# Patient Record
Sex: FEMALE | Race: WHITE | Employment: STUDENT | URBAN - METROPOLITAN AREA
[De-identification: names, ages, dates, MRNs, and addresses within clinical notes are randomized per-mention and may not be internally consistent; named-entity substitution may affect disease eponyms.]

---
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
## 2023-05-10 ENCOUNTER — OFFICE VISIT (OUTPATIENT)
Age: 23
End: 2023-05-10
Payer: COMMERCIAL

## 2023-05-10 VITALS
BODY MASS INDEX: 22.81 KG/M2 | HEIGHT: 69 IN | OXYGEN SATURATION: 98 % | HEART RATE: 92 BPM | DIASTOLIC BLOOD PRESSURE: 71 MMHG | SYSTOLIC BLOOD PRESSURE: 101 MMHG | TEMPERATURE: 97.6 F | WEIGHT: 154 LBS | RESPIRATION RATE: 18 BRPM

## 2023-05-10 DIAGNOSIS — Z76.89 ENCOUNTER TO ESTABLISH CARE WITH NEW DOCTOR: Primary | ICD-10-CM

## 2023-05-10 DIAGNOSIS — F41.1 GAD (GENERALIZED ANXIETY DISORDER): ICD-10-CM

## 2023-05-10 DIAGNOSIS — Z30.41 ENCOUNTER FOR SURVEILLANCE OF CONTRACEPTIVE PILLS: ICD-10-CM

## 2023-05-10 PROBLEM — R55 VASOVAGAL SYNCOPE: Status: ACTIVE | Noted: 2023-05-10

## 2023-05-10 PROCEDURE — 99395 PREV VISIT EST AGE 18-39: CPT | Performed by: STUDENT IN AN ORGANIZED HEALTH CARE EDUCATION/TRAINING PROGRAM

## 2023-05-10 RX ORDER — NORGESTIMATE AND ETHINYL ESTRADIOL 7DAYSX3 28
1 KIT ORAL DAILY
Qty: 1 PACKET | Refills: 11 | Status: SHIPPED | OUTPATIENT
Start: 2023-05-10

## 2023-05-10 RX ORDER — NORGESTIMATE AND ETHINYL ESTRADIOL 7DAYSX3 28
1 KIT ORAL DAILY
COMMUNITY
Start: 2023-02-13 | End: 2023-05-10 | Stop reason: SDUPTHER

## 2023-05-10 SDOH — ECONOMIC STABILITY: FOOD INSECURITY: WITHIN THE PAST 12 MONTHS, YOU WORRIED THAT YOUR FOOD WOULD RUN OUT BEFORE YOU GOT MONEY TO BUY MORE.: NEVER TRUE

## 2023-05-10 SDOH — ECONOMIC STABILITY: FOOD INSECURITY: WITHIN THE PAST 12 MONTHS, THE FOOD YOU BOUGHT JUST DIDN'T LAST AND YOU DIDN'T HAVE MONEY TO GET MORE.: NEVER TRUE

## 2023-05-10 ASSESSMENT — PATIENT HEALTH QUESTIONNAIRE - PHQ9
SUM OF ALL RESPONSES TO PHQ QUESTIONS 1-9: 0
SUM OF ALL RESPONSES TO PHQ9 QUESTIONS 1 & 2: 0
2. FEELING DOWN, DEPRESSED OR HOPELESS: 0
SUM OF ALL RESPONSES TO PHQ QUESTIONS 1-9: 0
SUM OF ALL RESPONSES TO PHQ QUESTIONS 1-9: 0
1. LITTLE INTEREST OR PLEASURE IN DOING THINGS: 0
SUM OF ALL RESPONSES TO PHQ QUESTIONS 1-9: 0

## 2023-05-10 ASSESSMENT — SOCIAL DETERMINANTS OF HEALTH (SDOH): HOW HARD IS IT FOR YOU TO PAY FOR THE VERY BASICS LIKE FOOD, HOUSING, MEDICAL CARE, AND HEATING?: NOT HARD AT ALL

## 2023-05-10 NOTE — PROGRESS NOTES
Devonte Alfaro is a 25y.o. year old female who is a new patient to me today (05/10/23). She was previous followed by [facility], last seen about 1.5 years ago but did have a visit at her school's health clinic about 90 days ago. Assessment & Plan:   1. Encounter to establish care with new doctor  - she thinks she had annual visit at the student health clinic earlier this year  - will request vaccine record from pediatrics   - encouraged her to avoid processed food and participate in regular physical activity  - no labs, she defers due to needle phobia and is a well adult   2. RICKI (generalized anxiety disorder)  Assessment & Plan:  Controlled, cont sertraline. She will let me know when she needs a refill   3. Encounter for surveillance of contraceptive pills  She requests refill of OCP. If she has issues with menses, etc will need to return to GYN.  -     TRI-ESTARYLLA 0.18/0.215/0.25 MG-35 MCG TABS; Take 1 tablet by mouth daily, Disp-1 packet, R-11, DAWNormal      Health Maintenance   Flu vaccine:   COVID vaccine: Bivalent Booster 9/2022  Tetanus vaccine:  may need booster, will check vaccine record from pediatrics  Shingles vaccine:   Pneumonia vaccine:  Cervical cancer screening: no indicated  Breast cancer screening:  Colon cancer screening:  DEXA:  Hep C: defer  Lipid: defer  DM: NA  Healthcare decision maker: parents  ACP:      RTC: 1 year or sooner PRN    Subjective:   Neel Angela was seen today for New Patient    GYN Dr Greg Guajardo  - prescribed OCP last year, she is doing well on this and wants to continue. She has no plans to go back to GYN this year because she is not active. Anxiety, panic attacks  - has been taking sertraline for the last 5 years. This medication helps a lot, she ran out recently and felt poorly off the medication. Went to [facility] and got a refill     Has vasovagal syncope with shots and blood work. Diet - eats out and eats at home.      Exercise Recently dx with mild to moderate MOHAN per home sleep study, and CPAP was initiated  The patient NS for her last sleep medicine appt  Rec she f/u with them as indicated

## 2023-12-14 ENCOUNTER — OFFICE VISIT (OUTPATIENT)
Age: 23
End: 2023-12-14
Payer: COMMERCIAL

## 2023-12-14 VITALS
HEART RATE: 92 BPM | HEIGHT: 69 IN | TEMPERATURE: 97.4 F | SYSTOLIC BLOOD PRESSURE: 101 MMHG | DIASTOLIC BLOOD PRESSURE: 65 MMHG | RESPIRATION RATE: 16 BRPM | WEIGHT: 155 LBS | BODY MASS INDEX: 22.96 KG/M2 | OXYGEN SATURATION: 95 %

## 2023-12-14 DIAGNOSIS — K60.2 ANAL FISSURE: Primary | ICD-10-CM

## 2023-12-14 PROCEDURE — 99213 OFFICE O/P EST LOW 20 MIN: CPT | Performed by: STUDENT IN AN ORGANIZED HEALTH CARE EDUCATION/TRAINING PROGRAM

## 2023-12-14 SDOH — ECONOMIC STABILITY: FOOD INSECURITY: WITHIN THE PAST 12 MONTHS, YOU WORRIED THAT YOUR FOOD WOULD RUN OUT BEFORE YOU GOT MONEY TO BUY MORE.: NEVER TRUE

## 2023-12-14 SDOH — ECONOMIC STABILITY: FOOD INSECURITY: WITHIN THE PAST 12 MONTHS, THE FOOD YOU BOUGHT JUST DIDN'T LAST AND YOU DIDN'T HAVE MONEY TO GET MORE.: NEVER TRUE

## 2023-12-14 SDOH — ECONOMIC STABILITY: INCOME INSECURITY: HOW HARD IS IT FOR YOU TO PAY FOR THE VERY BASICS LIKE FOOD, HOUSING, MEDICAL CARE, AND HEATING?: NOT HARD AT ALL

## 2023-12-14 SDOH — ECONOMIC STABILITY: HOUSING INSECURITY
IN THE LAST 12 MONTHS, WAS THERE A TIME WHEN YOU DID NOT HAVE A STEADY PLACE TO SLEEP OR SLEPT IN A SHELTER (INCLUDING NOW)?: NO

## 2023-12-22 ENCOUNTER — TELEPHONE (OUTPATIENT)
Age: 23
End: 2023-12-22

## 2023-12-22 NOTE — TELEPHONE ENCOUNTER
Reason for call:   please call mother regarding visit on 12/14/23 with [facility].  Has some concerns she would like to discuss with practice manager    Is this a new problem: Yes    Date of last appointment:  12/14/2023     Can we respond via [id number]: No    Best call back number:     Cerna,Mindy () [phone] (Home)

## 2024-04-08 DIAGNOSIS — Z30.41 ENCOUNTER FOR SURVEILLANCE OF CONTRACEPTIVE PILLS: ICD-10-CM

## 2024-04-08 RX ORDER — NORGESTIMATE AND ETHINYL ESTRADIOL 7DAYSX3 28
1 KIT ORAL DAILY
Qty: 84 TABLET | Refills: 0 | Status: SHIPPED | OUTPATIENT
Start: 2024-04-08

## 2024-04-08 NOTE — TELEPHONE ENCOUNTER
Last office visit 12/14/2023  NEXT APPT  With Internal Medicine (Nargis Cunha MD)  05/10/2024 at 11:00 AM        Last fill tri-estarylla 05/10/23

## 2024-05-09 RX ORDER — CLONAZEPAM 0.5 MG/1
TABLET ORAL
COMMUNITY
Start: 2024-02-24

## 2024-05-10 ENCOUNTER — OFFICE VISIT (OUTPATIENT)
Age: 24
End: 2024-05-10
Payer: COMMERCIAL

## 2024-05-10 VITALS
TEMPERATURE: 97.8 F | BODY MASS INDEX: 22.31 KG/M2 | HEIGHT: 69 IN | RESPIRATION RATE: 16 BRPM | DIASTOLIC BLOOD PRESSURE: 65 MMHG | SYSTOLIC BLOOD PRESSURE: 97 MMHG | HEART RATE: 90 BPM | OXYGEN SATURATION: 97 % | WEIGHT: 150.6 LBS

## 2024-05-10 DIAGNOSIS — F41.1 GAD (GENERALIZED ANXIETY DISORDER): ICD-10-CM

## 2024-05-10 DIAGNOSIS — Z00.00 ROUTINE GENERAL MEDICAL EXAMINATION AT A HEALTH CARE FACILITY: Primary | ICD-10-CM

## 2024-05-10 DIAGNOSIS — Z30.41 ENCOUNTER FOR SURVEILLANCE OF CONTRACEPTIVE PILLS: ICD-10-CM

## 2024-05-10 PROCEDURE — 99395 PREV VISIT EST AGE 18-39: CPT | Performed by: STUDENT IN AN ORGANIZED HEALTH CARE EDUCATION/TRAINING PROGRAM

## 2024-05-10 ASSESSMENT — PATIENT HEALTH QUESTIONNAIRE - PHQ9
2. FEELING DOWN, DEPRESSED OR HOPELESS: NOT AT ALL
SUM OF ALL RESPONSES TO PHQ QUESTIONS 1-9: 0
1. LITTLE INTEREST OR PLEASURE IN DOING THINGS: NOT AT ALL
SUM OF ALL RESPONSES TO PHQ QUESTIONS 1-9: 0
SUM OF ALL RESPONSES TO PHQ9 QUESTIONS 1 & 2: 0

## 2024-05-10 NOTE — ASSESSMENT & PLAN NOTE
Continue sertraline, she will let me know when she needs a refill.   She has been prescribed clonazepam by [facility] for panic attacks but hasn't use. We talked about how I can not continue this long term for her. We talked about some methods for anxiety de-escalation including square breathing. She has seen a therapist in the past and this may be an option in the future if anxiety and panic attacks are getting worse

## 2024-05-10 NOTE — PATIENT INSTRUCTIONS
You are due for a tetanus booster.  You may get this at the pharmacy or schedule a nurse visit with my office.

## 2024-05-10 NOTE — PROGRESS NOTES
Luc Cerna is a 23 y.o. year old female who presents today (05/10/24) for annual physical exam.    Assessment & Plan:   1. Routine general medical examination at a health care facility  Reviewed diet and exercise habits - she does well with this. Updated health maintenance, see below. We discussed getting a tetanus booster and she began to panic. She will get from pharmacy or schedule nurse visit and come with mom. She defers screening blood work, this is fine considering she feels well and is not on any medication that requires monitoring  2. RICKI (generalized anxiety disorder)  Assessment & Plan:  Continue sertraline, she will let me know when she needs a refill.   She has been prescribed clonazepam by [facility] for panic attacks but hasn't use. We talked about how I can not continue this long term for her. We talked about some methods for anxiety de-escalation including square breathing. She has seen a therapist in the past and this may be an option in the future if anxiety and panic attacks are getting worse   3. Encounter for surveillance of contraceptive pills  Assessment & Plan:  She is doing well on OCP, continue     Health Maintenance   Flu vaccine: got at school in the fall   COVID vaccine:  got at school in the fall   Tetanus vaccine:  5/2013 - recommended booster. She had a local reaction in the past.  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     RICKI, panic attacks  - sertraline  - school clinic gave her clonazepam 2 months ago incase she has a panic attack but she hasn't had one since then.     OCP - no longer seeing GYN. She has pretty

## 2024-07-02 DIAGNOSIS — Z30.41 ENCOUNTER FOR SURVEILLANCE OF CONTRACEPTIVE PILLS: ICD-10-CM

## 2024-07-02 RX ORDER — NORGESTIMATE AND ETHINYL ESTRADIOL 7DAYSX3 28
1 KIT ORAL DAILY
Qty: 1 PACKET | Refills: 9 | Status: SHIPPED | OUTPATIENT
Start: 2024-07-02

## 2024-07-02 NOTE — TELEPHONE ENCOUNTER
Pt last seen on 5/10/24. Due to return in one year.    Has no appt scheduled at this time. Will forward to front office pool to call pt.    Rx last filled on 5/10/23 #1 pack/11RF.    Will forward to MD for refill.

## 2024-08-21 ENCOUNTER — PATIENT MESSAGE (OUTPATIENT)
Age: 24
End: 2024-08-21

## 2024-10-31 ENCOUNTER — TELEPHONE (OUTPATIENT)
Age: 24
End: 2024-10-31

## 2024-10-31 DIAGNOSIS — Z30.41 ENCOUNTER FOR SURVEILLANCE OF CONTRACEPTIVE PILLS: ICD-10-CM

## 2024-10-31 RX ORDER — NORGESTIMATE AND ETHINYL ESTRADIOL 7DAYSX3 28
1 KIT ORAL DAILY
Qty: 3 PACKET | Refills: 2 | Status: SHIPPED | OUTPATIENT
Start: 2024-10-31

## 2024-10-31 NOTE — TELEPHONE ENCOUNTER
PHARMACY CHANGE (Confirmed with patient)    Medication Refill Request    Luc Cerna is requesting a refill of the following medication(s):     Tri-Estarylla 0.18/0.215/0.25 MG-35 MCG TABS    Please send refill to:     [facility] - [place], [place] - [address] [phone] - F [phone]

## 2024-10-31 NOTE — TELEPHONE ENCOUNTER
Pt last seen on 5/10/24. Due to return in one year    Has no appt scheduled at this time. Will forward to front office pool to call pt.    Rx last filled on 7/2/24 #1 pack/11RF.    Pharmacy changed - pt requesting Rx be sent to [facility] -Verbal Order Read Back with provider.

## 2025-04-24 DIAGNOSIS — Z30.41 ENCOUNTER FOR SURVEILLANCE OF CONTRACEPTIVE PILLS: ICD-10-CM

## 2025-04-24 RX ORDER — NORGESTIMATE AND ETHINYL ESTRADIOL 7DAYSX3 28
1 KIT ORAL DAILY
Qty: 84 TABLET | Refills: 1 | OUTPATIENT
Start: 2025-04-24

## 2025-04-24 NOTE — TELEPHONE ENCOUNTER
Pt last seen on 5/10/2024. Due to return in one year.    Has no appt scheduled at this time. Will forward to front office pool to call pt.    Rx last filled on 10/31/24 #3/2RF.    Will forward to MD for refill.

## 2025-06-19 DIAGNOSIS — Z30.41 ENCOUNTER FOR SURVEILLANCE OF CONTRACEPTIVE PILLS: ICD-10-CM

## 2025-06-19 RX ORDER — NORGESTIMATE AND ETHINYL ESTRADIOL 7DAYSX3 28
1 KIT ORAL DAILY
Qty: 84 TABLET | Refills: 1 | OUTPATIENT
Start: 2025-06-19

## 2025-06-19 RX ORDER — NORGESTIMATE AND ETHINYL ESTRADIOL 7DAYSX3 28
1 KIT ORAL DAILY
Qty: 1 PACKET | Refills: 0 | Status: SHIPPED | OUTPATIENT
Start: 2025-06-19

## 2025-06-19 NOTE — TELEPHONE ENCOUNTER
Pt last seen on 5/10/2024. Due to return in one year.    Has appt on 7/22/2025.    Rx last filled on 10/31/24 #3 packs/2RF.    Will forward to provider on call for refill.

## 2025-06-26 ENCOUNTER — TELEPHONE (OUTPATIENT)
Age: 25
End: 2025-06-26

## 2025-06-26 NOTE — TELEPHONE ENCOUNTER
Called the pharmacy and spoke with Dion he stated that their is a Rx to be filled, however they will have to order the medication, I called and left a message for the patient to reach out to the pharmacy to have RX filled and pick-up.      Thank you  Xuan Marcial LPN

## 2025-07-07 NOTE — TELEPHONE ENCOUNTER
Pt last seen on 5/10/2024. Due to return in one year.    Has appt on 7/22/2025.    Rx last filled on 4/9/25 #90/0RF.    Rx sent to pharmacy as previously filled and verified by Verbal Order Read Back with provider.

## 2025-07-22 ENCOUNTER — OFFICE VISIT (OUTPATIENT)
Age: 25
End: 2025-07-22
Payer: COMMERCIAL

## 2025-07-22 VITALS
SYSTOLIC BLOOD PRESSURE: 107 MMHG | OXYGEN SATURATION: 95 % | HEIGHT: 68 IN | WEIGHT: 153 LBS | BODY MASS INDEX: 23.19 KG/M2 | TEMPERATURE: 98.1 F | RESPIRATION RATE: 18 BRPM | HEART RATE: 80 BPM | DIASTOLIC BLOOD PRESSURE: 72 MMHG

## 2025-07-22 DIAGNOSIS — F41.1 GAD (GENERALIZED ANXIETY DISORDER): ICD-10-CM

## 2025-07-22 DIAGNOSIS — Z00.00 ROUTINE GENERAL MEDICAL EXAMINATION AT A HEALTH CARE FACILITY: ICD-10-CM

## 2025-07-22 DIAGNOSIS — Z00.00 ROUTINE GENERAL MEDICAL EXAMINATION AT A HEALTH CARE FACILITY: Primary | ICD-10-CM

## 2025-07-22 DIAGNOSIS — R55 VASOVAGAL SYNCOPE: ICD-10-CM

## 2025-07-22 DIAGNOSIS — Z30.41 ENCOUNTER FOR SURVEILLANCE OF CONTRACEPTIVE PILLS: ICD-10-CM

## 2025-07-22 DIAGNOSIS — T50.A95D: ICD-10-CM

## 2025-07-22 PROBLEM — T50.A95A LOCAL REACTION TO TETANUS VACCINE: Status: ACTIVE | Noted: 2025-07-22

## 2025-07-22 LAB
ALBUMIN SERPL-MCNC: 3.6 G/DL (ref 3.5–5)
ALBUMIN/GLOB SERPL: 1.2 (ref 1.1–2.2)
ALP SERPL-CCNC: 56 U/L (ref 45–117)
ALT SERPL-CCNC: 19 U/L (ref 12–78)
ANION GAP SERPL CALC-SCNC: 5 MMOL/L (ref 2–12)
AST SERPL-CCNC: 16 U/L (ref 15–37)
BILIRUB SERPL-MCNC: 0.4 MG/DL (ref 0.2–1)
BUN SERPL-MCNC: 12 MG/DL (ref 6–20)
BUN/CREAT SERPL: 15 (ref 12–20)
CALCIUM SERPL-MCNC: 8.9 MG/DL (ref 8.5–10.1)
CHLORIDE SERPL-SCNC: 107 MMOL/L (ref 97–108)
CHOLEST SERPL-MCNC: 179 MG/DL
CO2 SERPL-SCNC: 26 MMOL/L (ref 21–32)
CREAT SERPL-MCNC: 0.79 MG/DL (ref 0.55–1.02)
ERYTHROCYTE [DISTWIDTH] IN BLOOD BY AUTOMATED COUNT: 13.8 % (ref 11.5–14.5)
GLOBULIN SER CALC-MCNC: 3 G/DL (ref 2–4)
GLUCOSE SERPL-MCNC: 93 MG/DL (ref 65–100)
HCT VFR BLD AUTO: 35 % (ref 35–47)
HDLC SERPL-MCNC: 62 MG/DL
HDLC SERPL: 2.9 (ref 0–5)
HGB BLD-MCNC: 10.9 G/DL (ref 11.5–16)
LDLC SERPL CALC-MCNC: 101 MG/DL (ref 0–100)
MCH RBC QN AUTO: 26.5 PG (ref 26–34)
MCHC RBC AUTO-ENTMCNC: 31.1 G/DL (ref 30–36.5)
MCV RBC AUTO: 85 FL (ref 80–99)
NRBC # BLD: 0 K/UL (ref 0–0.01)
NRBC BLD-RTO: 0 PER 100 WBC
PLATELET # BLD AUTO: 221 K/UL (ref 150–400)
PMV BLD AUTO: 11.4 FL (ref 8.9–12.9)
POTASSIUM SERPL-SCNC: 3.8 MMOL/L (ref 3.5–5.1)
PROT SERPL-MCNC: 6.6 G/DL (ref 6.4–8.2)
RBC # BLD AUTO: 4.12 M/UL (ref 3.8–5.2)
SODIUM SERPL-SCNC: 138 MMOL/L (ref 136–145)
TRIGL SERPL-MCNC: 80 MG/DL
VLDLC SERPL CALC-MCNC: 16 MG/DL
WBC # BLD AUTO: 3.2 K/UL (ref 3.6–11)

## 2025-07-22 PROCEDURE — 99395 PREV VISIT EST AGE 18-39: CPT | Performed by: STUDENT IN AN ORGANIZED HEALTH CARE EDUCATION/TRAINING PROGRAM

## 2025-07-22 RX ORDER — NORGESTIMATE AND ETHINYL ESTRADIOL 7DAYSX3 28
1 KIT ORAL DAILY
Qty: 3 PACKET | Refills: 3 | Status: SHIPPED | OUTPATIENT
Start: 2025-07-22

## 2025-07-22 RX ORDER — HYDROQUINONE 40 MG/G
CREAM TOPICAL
COMMUNITY
Start: 2025-07-02

## 2025-07-22 SDOH — ECONOMIC STABILITY: FOOD INSECURITY: WITHIN THE PAST 12 MONTHS, THE FOOD YOU BOUGHT JUST DIDN'T LAST AND YOU DIDN'T HAVE MONEY TO GET MORE.: NEVER TRUE

## 2025-07-22 SDOH — ECONOMIC STABILITY: FOOD INSECURITY: WITHIN THE PAST 12 MONTHS, YOU WORRIED THAT YOUR FOOD WOULD RUN OUT BEFORE YOU GOT MONEY TO BUY MORE.: NEVER TRUE

## 2025-07-22 ASSESSMENT — PATIENT HEALTH QUESTIONNAIRE - PHQ9
2. FEELING DOWN, DEPRESSED OR HOPELESS: NOT AT ALL
1. LITTLE INTEREST OR PLEASURE IN DOING THINGS: NOT AT ALL
SUM OF ALL RESPONSES TO PHQ QUESTIONS 1-9: 0

## 2025-07-22 NOTE — PATIENT INSTRUCTIONS
You have had dtap as a child and then tdap in 2013.   You may be allergic to a vaccine component or have a type of hypersensitivity to the tetanus toxoid call Arthus reaction.  Consider Td booster - if you do not have a reaction to this then I would suspect allergy to the tdap vaccine.   Be aware that you are not currently immunized against tetanus. If you get a cut or wound sustained outdoors, bite or scratch from a dog or cat you do need to get a tetanus booster immediately.

## 2025-07-28 LAB
FERRITIN SERPL-MCNC: 3 NG/ML (ref 8–252)
IRON SATN MFR SERPL: 10 % (ref 20–50)
IRON SERPL-MCNC: 52 UG/DL (ref 35–150)
TIBC SERPL-MCNC: 498 UG/DL (ref 250–450)